# Patient Record
Sex: MALE | URBAN - METROPOLITAN AREA
[De-identification: names, ages, dates, MRNs, and addresses within clinical notes are randomized per-mention and may not be internally consistent; named-entity substitution may affect disease eponyms.]

---

## 2021-03-01 ENCOUNTER — OFFICE VISIT (OUTPATIENT)
Dept: URBAN - METROPOLITAN AREA CLINIC 25 | Facility: CLINIC | Age: 46
End: 2021-03-01
Payer: COMMERCIAL

## 2021-03-01 DIAGNOSIS — B02.31 ZOSTER CONJUNCTIVITIS: Primary | ICD-10-CM

## 2021-03-01 PROCEDURE — 99204 OFFICE O/P NEW MOD 45 MIN: CPT | Performed by: OPTOMETRIST

## 2021-03-01 ASSESSMENT — INTRAOCULAR PRESSURE: OS: 16

## 2021-03-01 NOTE — IMPRESSION/PLAN
Impression: Zoster conjunctivitis: B02.31. Plan: pt edu. continue valacyclovir. no drops needed at this time. no ocular pathology.